# Patient Record
(demographics unavailable — no encounter records)

---

## 2024-11-08 NOTE — HISTORY OF PRESENT ILLNESS
[de-identified] : The patient is a 48 year  old right hand dominant male who presents today complaining of right knee pain.  Date of Injury/Onset: 2021 Pain:    At Rest: 6/10  With Activity:  9-10/10  Mechanism of injury: Unsure, possibly from playing soccer with his son.  This is NOT a Work Related Injury being treated under Worker's Compensation. This is NOT an athletic injury occurring associated with an interscholastic or organized sports team. Quality of symptoms: posterior knee pain, clicking/cracking, weakness, paresthesia from knee to mid foot, limited ROM  Improves with: rest  Worse with: deep flexion, end range extension, walking, stairs  Prior treatment: Dr. 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

## 2024-11-08 NOTE — IMAGING
[Right] : right knee [All Views] : anteroposterior, lateral, skyline, and anteroposterior standing [de-identified] : The patient is a well appearing 48 year old male of their stated age. Patient ambulates with a normal gait. POSITIVE straight leg raise bilateral.   Effected Knee: RIGHT  ROM:  0-130 degrees Lachman: Negative Pivot Shift: Negative Anterior Drawer: Negative Posterior Drawer / Sag: Negative Varus Stress 0 degrees: Stable Varus Stress 30 degrees: Stable Valgus Stress 0 degrees: Stable Valgus Stress 30 degrees: Stable Medial Carol: Negative Lateral Carol: Negative Patella Glide: 2+ Patella Apprehension: Negative Patella Grind: Negative Pes [place] Valgus: Negative Pes Cavus: Negative   Palpation: Medial Joint Line: Nontender Lateral Joint Line: Nontender Medial Collateral Ligament: Nontender Lateral Collateral Ligament/PLC: Nontender Distal Femur: Nontender Proximal Tibia: Nontender Tibial Tubercle: Nontender Gerdy's Tubercle: Nontender Distal Pole Patella: Nontender Quadriceps Tendon: Nontender &  Intact Patella Tendon: Nontender &  Intact Medial Femoral Condyle: Nontender Medial Distal Hamstring/PES: Nontender Lateral Distal Hamstring: TENDER & Stable Biceps Femoris: TENDER  Iliotibial Band: Nontender Medial Patellofemoral Ligament: Nontender Adductor: Nontender Proximal GSC-Plantaris: Nontender Calf: Supple & Nontender   Inspection: Deformity: No Erythema: No Ecchymosis: No Abrasions: No Effusion: MILD  Prepatellar Bursitis: No Neurologic Exam: Sensation L4-S1: Grossly Intact Motor Exam: Quadriceps: 4+ out of 5 Hamstrings: 5 out of 5 EHL: 5 out of 5 FHL: 5 out of 5 TA: 5 out of 5 GS: 5 out of 5 Circulatory/Pulses: Dorsalis Pedis: 2+ Posterior Tibialis: 2+ Additional Pertinent Findings: None   Contralateral Knee:                           ROM: 0-145 degrees Other Pertinent Findings: None   Assessment: The patient is a 48 year old male with right knee pain and radiographic and physical exam findings consistent with tricompartmental arthritis and possible lumbar spine HNP.  The patient's condition is acute. Documents/Results Reviewed Today: X-Ray right knee and X-Ray lumbar spine  Tests/Studies Independently Interpreted Today: X-Ray right knee reveals evidence of tricompartmental osteoarthritis worse medial and patellofemoral. X-Ray lumbar spine reveals evidence of multiple level end plate spurring with degenerative narrowing of L4-L5 and L5-S1  Pertinent findings include: 0-130, mild effusion, +patellofemoral grind, tender biceps femoris, tender lateral hamstring, weakness in hip flexion, +SLR  Confounding medical conditions/concerns: None   Plan: Due to the patients report of paresthesia into his foot, an X-Ray of the patient's lumbar spine was ordered in office. Discussed treatment options for the patient's knee arthritis, both operative vs non-operative. Advised that some of his knee pain may be radicular in nature therefore, recommended he obtain an MRI lumbar spine to rule out HNP. The patient is prescribed a Climaflex Medial  knee Brace today as a mechanical non-operative intervention to reduce pain, improve physical function, and offload pressure on the damaged joint. Discussed taking OTC anti-inflammatories as needed - use as directed. Modify activity as discussed.  Tests Ordered: MRI lumbar spine  Prescription Medications Ordered: Discussed appropriate use of OTC anti-inflammatories and analgesics (including but not limited to Aleve, Advil, Tylenol, Motrin, Ibuprofen, Voltaren gel, etc.) Braces/DME Ordered: Climaflex Medial  Brace Activity/Work/Sports Status: None Additional Instructions: None Follow-Up: after MRI   Letter of Medical Necessity for Climaflex Medial  Brace:   The patient is prescribed a Climaflex Medial  knee Brace today as a mechanical non-operative intervention to reduce pain, improve physical function, and offload pressure on the damaged joint. This brace is indicated to mediate pain relief in conjunction with knee osteoarthritis and malalignment. The custom nature of the brace is required to match the natural contours of this patient's thigh to calf ratio which would not fit into an off the shelf model due to surgical changes and thigh atrophy.   The patient's current medication management of their orthopedic diagnosis was reviewed today: (1) We discussed a comprehensive treatment plan that included possible pharmaceutical management involving the use of prescription strength medications including but not limited to options such as oral Naprosyn 500mg BID, once daily Meloxicam 15 mg, or 500-650 mg Tylenol versus over the counter oral medications and topical prescription NSAID Pennsaid vs over the counter Voltaren gel.  Based on our extensive discussion, the patient declined prescription medication and will use over the counter Advil, Alleve, Voltaren Gel or Tylenol as directed. (2) There is a moderate risk of morbidity with further treatment, especially from use of prescription strength medications and possible side effects of these medications which include upset stomach with oral medications, skin reactions to topical medications and cardiac/renal issues with long term use. (3) I recommended that the patient follow-up with their medical physician to discuss any significant specific potential issues with long term medication use such as interactions with current medications or with exacerbation of underlying medical comorbidities. (4) The benefits and risks associated with use of injectable, oral or topical, prescription and over the counter anti-inflammatory medications were discussed with the patient. The patient voiced understanding of the risks including but not limited to bleeding, stroke, kidney dysfunction, heart disease, and were referred to the black box warning label for further information.   IReba attest that this documentation has been prepared under the direction and in the presence of Provider Dr. Parth Ortiz.   The documentation recorded by the scribe accurately reflects the services IDr. Parth, personally performed and the decisions made by me. [FreeTextEntry1] : X-Ray lumbar spine reveals evidence of multiple level end plate spurring with degenerative narrowing of L4-L5 and L5-S1.  [FreeTextEntry9] : X-Ray right knee reveals evidence of tricompartmental osteoarthritis worse medial and patellofemoral.

## 2024-11-08 NOTE — HISTORY OF PRESENT ILLNESS
[de-identified] : The patient is a 48 year  old right hand dominant male who presents today complaining of right knee pain.  Date of Injury/Onset: 2021 Pain:    At Rest: 6/10  With Activity:  9-10/10  Mechanism of injury: Unsure, possibly from playing soccer with his son.  This is NOT a Work Related Injury being treated under Worker's Compensation. This is NOT an athletic injury occurring associated with an interscholastic or organized sports team. Quality of symptoms: posterior knee pain, clicking/cracking, weakness, paresthesia from knee to mid foot, limited ROM  Improves with: rest  Worse with: deep flexion, end range extension, walking, stairs  Prior treatment: Dr. 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

## 2024-11-08 NOTE — IMAGING
[Right] : right knee [All Views] : anteroposterior, lateral, skyline, and anteroposterior standing [de-identified] : The patient is a well appearing 48 year old male of their stated age. Patient ambulates with a normal gait. POSITIVE straight leg raise bilateral.   Effected Knee: RIGHT  ROM:  0-130 degrees Lachman: Negative Pivot Shift: Negative Anterior Drawer: Negative Posterior Drawer / Sag: Negative Varus Stress 0 degrees: Stable Varus Stress 30 degrees: Stable Valgus Stress 0 degrees: Stable Valgus Stress 30 degrees: Stable Medial Carol: Negative Lateral Carol: Negative Patella Glide: 2+ Patella Apprehension: Negative Patella Grind: Negative Pes [place] Valgus: Negative Pes Cavus: Negative   Palpation: Medial Joint Line: Nontender Lateral Joint Line: Nontender Medial Collateral Ligament: Nontender Lateral Collateral Ligament/PLC: Nontender Distal Femur: Nontender Proximal Tibia: Nontender Tibial Tubercle: Nontender Gerdy's Tubercle: Nontender Distal Pole Patella: Nontender Quadriceps Tendon: Nontender &  Intact Patella Tendon: Nontender &  Intact Medial Femoral Condyle: Nontender Medial Distal Hamstring/PES: Nontender Lateral Distal Hamstring: TENDER & Stable Biceps Femoris: TENDER  Iliotibial Band: Nontender Medial Patellofemoral Ligament: Nontender Adductor: Nontender Proximal GSC-Plantaris: Nontender Calf: Supple & Nontender   Inspection: Deformity: No Erythema: No Ecchymosis: No Abrasions: No Effusion: MILD  Prepatellar Bursitis: No Neurologic Exam: Sensation L4-S1: Grossly Intact Motor Exam: Quadriceps: 4+ out of 5 Hamstrings: 5 out of 5 EHL: 5 out of 5 FHL: 5 out of 5 TA: 5 out of 5 GS: 5 out of 5 Circulatory/Pulses: Dorsalis Pedis: 2+ Posterior Tibialis: 2+ Additional Pertinent Findings: None   Contralateral Knee:                           ROM: 0-145 degrees Other Pertinent Findings: None   Assessment: The patient is a 48 year old male with right knee pain and radiographic and physical exam findings consistent with tricompartmental arthritis and possible lumbar spine HNP.  The patient's condition is acute. Documents/Results Reviewed Today: X-Ray right knee and X-Ray lumbar spine  Tests/Studies Independently Interpreted Today: X-Ray right knee reveals evidence of tricompartmental osteoarthritis worse medial and patellofemoral. X-Ray lumbar spine reveals evidence of multiple level end plate spurring with degenerative narrowing of L4-L5 and L5-S1  Pertinent findings include: 0-130, mild effusion, +patellofemoral grind, tender biceps femoris, tender lateral hamstring, weakness in hip flexion, +SLR  Confounding medical conditions/concerns: None   Plan: Due to the patients report of paresthesia into his foot, an X-Ray of the patient's lumbar spine was ordered in office. Discussed treatment options for the patient's knee arthritis, both operative vs non-operative. Advised that some of his knee pain may be radicular in nature therefore, recommended he obtain an MRI lumbar spine to rule out HNP. The patient is prescribed a Climaflex Medial  knee Brace today as a mechanical non-operative intervention to reduce pain, improve physical function, and offload pressure on the damaged joint. Discussed taking OTC anti-inflammatories as needed - use as directed. Modify activity as discussed.  Tests Ordered: MRI lumbar spine  Prescription Medications Ordered: Discussed appropriate use of OTC anti-inflammatories and analgesics (including but not limited to Aleve, Advil, Tylenol, Motrin, Ibuprofen, Voltaren gel, etc.) Braces/DME Ordered: Climaflex Medial  Brace Activity/Work/Sports Status: None Additional Instructions: None Follow-Up: after MRI   Letter of Medical Necessity for Climaflex Medial  Brace:   The patient is prescribed a Climaflex Medial  knee Brace today as a mechanical non-operative intervention to reduce pain, improve physical function, and offload pressure on the damaged joint. This brace is indicated to mediate pain relief in conjunction with knee osteoarthritis and malalignment. The custom nature of the brace is required to match the natural contours of this patient's thigh to calf ratio which would not fit into an off the shelf model due to surgical changes and thigh atrophy.   The patient's current medication management of their orthopedic diagnosis was reviewed today: (1) We discussed a comprehensive treatment plan that included possible pharmaceutical management involving the use of prescription strength medications including but not limited to options such as oral Naprosyn 500mg BID, once daily Meloxicam 15 mg, or 500-650 mg Tylenol versus over the counter oral medications and topical prescription NSAID Pennsaid vs over the counter Voltaren gel.  Based on our extensive discussion, the patient declined prescription medication and will use over the counter Advil, Alleve, Voltaren Gel or Tylenol as directed. (2) There is a moderate risk of morbidity with further treatment, especially from use of prescription strength medications and possible side effects of these medications which include upset stomach with oral medications, skin reactions to topical medications and cardiac/renal issues with long term use. (3) I recommended that the patient follow-up with their medical physician to discuss any significant specific potential issues with long term medication use such as interactions with current medications or with exacerbation of underlying medical comorbidities. (4) The benefits and risks associated with use of injectable, oral or topical, prescription and over the counter anti-inflammatory medications were discussed with the patient. The patient voiced understanding of the risks including but not limited to bleeding, stroke, kidney dysfunction, heart disease, and were referred to the black box warning label for further information.   IReba attest that this documentation has been prepared under the direction and in the presence of Provider Dr. Parth Ortiz.   The documentation recorded by the scribe accurately reflects the services IDr. Parth, personally performed and the decisions made by me. [FreeTextEntry1] : X-Ray lumbar spine reveals evidence of multiple level end plate spurring with degenerative narrowing of L4-L5 and L5-S1.  [FreeTextEntry9] : X-Ray right knee reveals evidence of tricompartmental osteoarthritis worse medial and patellofemoral.